# Patient Record
Sex: FEMALE | Race: BLACK OR AFRICAN AMERICAN
[De-identification: names, ages, dates, MRNs, and addresses within clinical notes are randomized per-mention and may not be internally consistent; named-entity substitution may affect disease eponyms.]

---

## 2021-02-01 ENCOUNTER — HOSPITAL ENCOUNTER (OUTPATIENT)
Dept: HOSPITAL 92 - BICCT | Age: 51
Discharge: HOME | End: 2021-02-01
Attending: INTERNAL MEDICINE
Payer: COMMERCIAL

## 2021-02-01 DIAGNOSIS — R19.7: ICD-10-CM

## 2021-02-01 DIAGNOSIS — R10.13: Primary | ICD-10-CM

## 2021-02-01 DIAGNOSIS — R63.4: ICD-10-CM

## 2021-02-01 PROCEDURE — 74177 CT ABD & PELVIS W/CONTRAST: CPT

## 2021-02-01 NOTE — CT
CT of theabdomen and pelvis:

2/1/2021



COMPARISON:None available



HISTORY:Epigastric pain, right-sided abdominal pain for 5 months



Change in stool



TECHNIQUE: Serial axial CT imaging at5 mm intervals from thelung bases through the pubic symphysis wi
th IV and oral contrast.  Coronal and sagittal reformatted imaging obtained. 



Findings:The imaged lung bases are unremarkable. No free intraperitoneal air or fluid.



The gallbladder is contracted and not well evaluated on this study. No focal liver lesion. The spleen
, pancreas, adrenal glands, and kidneys demonstrate no acute findings.



The uterus appears surgically absent.



The rectum is expanded and filled with stool. There is a broad based umbilical hernia containing a lo
op of nonobstructed bowel, likely small bowel.



Colon is decompressed/collapse and multiple regions, limiting assessment. No evidence for bowel obstr
uction, or focal bowel inflammatory change, or appendicitis.



The vascular structures appear patent. No abdominal or pelvic lymphadenopathy. No acute osseous abnor
mality.



IMPRESSION: No acute findings within the abdomen/pelvis.



Reported By: Javon Schofield 

Electronically Signed:  2/1/2021 10:38 AM

## 2021-03-11 ENCOUNTER — HOSPITAL ENCOUNTER (OUTPATIENT)
Dept: HOSPITAL 92 - LABBT | Age: 51
Discharge: HOME | End: 2021-03-11
Attending: SURGERY
Payer: COMMERCIAL

## 2021-03-11 DIAGNOSIS — K81.9: ICD-10-CM

## 2021-03-11 DIAGNOSIS — Z01.812: Primary | ICD-10-CM

## 2021-03-11 DIAGNOSIS — Z20.822: ICD-10-CM

## 2021-03-11 LAB
ALBUMIN SERPL BCG-MCNC: 4.3 G/DL (ref 3.5–5)
ALP SERPL-CCNC: 67 U/L (ref 40–110)
ALT SERPL W P-5'-P-CCNC: 11 U/L (ref 8–55)
ANION GAP SERPL CALC-SCNC: 13 MMOL/L (ref 10–20)
AST SERPL-CCNC: 14 U/L (ref 5–34)
BASOPHILS # BLD AUTO: 0 10X3/UL (ref 0–0.2)
BASOPHILS NFR BLD AUTO: 0.6 % (ref 0–2)
BILIRUB DIRECT SERPL-MCNC: 0.2 MG/DL (ref 0.1–0.3)
BILIRUB SERPL-MCNC: 0.4 MG/DL (ref 0.2–1.2)
BUN SERPL-MCNC: 14 MG/DL (ref 7–18.7)
CALCIUM SERPL-MCNC: 9.4 MG/DL (ref 7.8–10.44)
CHLORIDE SERPL-SCNC: 102 MMOL/L (ref 98–107)
CO2 SERPL-SCNC: 29 MMOL/L (ref 22–29)
CREAT CL PREDICTED SERPL C-G-VRATE: 0 ML/MIN (ref 70–130)
EOSINOPHIL # BLD AUTO: 0.1 10X3/UL (ref 0–0.5)
EOSINOPHIL NFR BLD AUTO: 1.8 % (ref 0–6)
GLUCOSE SERPL-MCNC: 76 MG/DL (ref 70–105)
HGB BLD-MCNC: 12.6 G/DL (ref 12–15.5)
LYMPHOCYTES NFR BLD AUTO: 33.3 % (ref 18–47)
MCH RBC QN AUTO: 27.8 PG (ref 27–33)
MCV RBC AUTO: 87.4 FL (ref 81.6–98.3)
MONOCYTES # BLD AUTO: 0.4 10X3/UL (ref 0–1.1)
MONOCYTES NFR BLD AUTO: 7.4 % (ref 0–10)
NEUTROPHILS # BLD AUTO: 2.8 10X3/UL (ref 1.5–8.4)
NEUTROPHILS NFR BLD AUTO: 56.5 % (ref 40–75)
PLATELET # BLD AUTO: 242 10X3/UL (ref 150–450)
POTASSIUM SERPL-SCNC: 4.5 MMOL/L (ref 3.5–5.1)
RBC # BLD AUTO: 4.54 10X6/UL (ref 3.9–5.03)
SODIUM SERPL-SCNC: 139 MMOL/L (ref 136–145)
WBC # BLD AUTO: 5 10X3/UL (ref 3.5–10.5)

## 2021-03-11 PROCEDURE — U0005 INFEC AGEN DETEC AMPLI PROBE: HCPCS

## 2021-03-11 PROCEDURE — U0003 INFECTIOUS AGENT DETECTION BY NUCLEIC ACID (DNA OR RNA); SEVERE ACUTE RESPIRATORY SYNDROME CORONAVIRUS 2 (SARS-COV-2) (CORONAVIRUS DISEASE [COVID-19]), AMPLIFIED PROBE TECHNIQUE, MAKING USE OF HIGH THROUGHPUT TECHNOLOGIES AS DESCRIBED BY CMS-2020-01-R: HCPCS

## 2021-03-11 PROCEDURE — 87635 SARS-COV-2 COVID-19 AMP PRB: CPT

## 2021-03-11 PROCEDURE — 80048 BASIC METABOLIC PNL TOTAL CA: CPT

## 2021-03-11 PROCEDURE — 80076 HEPATIC FUNCTION PANEL: CPT

## 2021-03-11 PROCEDURE — 85025 COMPLETE CBC W/AUTO DIFF WBC: CPT

## 2021-03-16 ENCOUNTER — HOSPITAL ENCOUNTER (OUTPATIENT)
Dept: HOSPITAL 92 - SDC | Age: 51
Discharge: HOME | End: 2021-03-16
Attending: SURGERY
Payer: COMMERCIAL

## 2021-03-16 VITALS — BODY MASS INDEX: 34.7 KG/M2

## 2021-03-16 DIAGNOSIS — E66.9: ICD-10-CM

## 2021-03-16 DIAGNOSIS — Z88.6: ICD-10-CM

## 2021-03-16 DIAGNOSIS — J30.2: ICD-10-CM

## 2021-03-16 DIAGNOSIS — Z79.899: ICD-10-CM

## 2021-03-16 DIAGNOSIS — I10: ICD-10-CM

## 2021-03-16 DIAGNOSIS — K80.10: Primary | ICD-10-CM

## 2021-03-16 PROCEDURE — 88304 TISSUE EXAM BY PATHOLOGIST: CPT

## 2021-03-16 PROCEDURE — 0FT44ZZ RESECTION OF GALLBLADDER, PERCUTANEOUS ENDOSCOPIC APPROACH: ICD-10-PCS | Performed by: SURGERY

## 2021-03-16 PROCEDURE — S0020 INJECTION, BUPIVICAINE HYDRO: HCPCS

## 2022-06-30 ENCOUNTER — HOSPITAL ENCOUNTER (OUTPATIENT)
Dept: HOSPITAL 92 - BICMRI | Age: 52
Discharge: HOME | End: 2022-06-30
Attending: ORTHOPAEDIC SURGERY
Payer: COMMERCIAL

## 2022-06-30 DIAGNOSIS — M67.432: Primary | ICD-10-CM

## 2022-07-29 ENCOUNTER — HOSPITAL ENCOUNTER (OUTPATIENT)
Dept: HOSPITAL 92 - LABBT | Age: 52
Discharge: HOME | End: 2022-07-29
Attending: ORTHOPAEDIC SURGERY
Payer: COMMERCIAL

## 2022-07-29 DIAGNOSIS — Z20.822: ICD-10-CM

## 2022-07-29 DIAGNOSIS — M67.432: ICD-10-CM

## 2022-07-29 DIAGNOSIS — Z01.812: Primary | ICD-10-CM

## 2022-07-29 LAB
ANION GAP SERPL CALC-SCNC: 13 MMOL/L (ref 10–20)
BASOPHILS # BLD AUTO: 0 10X3/UL (ref 0–0.2)
BASOPHILS NFR BLD AUTO: 0.8 % (ref 0–2)
BUN SERPL-MCNC: 14 MG/DL (ref 9.8–20.1)
CALCIUM SERPL-MCNC: 9.7 MG/DL (ref 7.8–10.44)
CHLORIDE SERPL-SCNC: 104 MMOL/L (ref 98–107)
CO2 SERPL-SCNC: 26 MMOL/L (ref 22–29)
CREAT CL PREDICTED SERPL C-G-VRATE: 0 ML/MIN (ref 70–130)
EOSINOPHIL # BLD AUTO: 0.1 10X3/UL (ref 0–0.5)
EOSINOPHIL NFR BLD AUTO: 2.5 % (ref 0–6)
GLUCOSE SERPL-MCNC: 98 MG/DL (ref 70–105)
HGB BLD-MCNC: 13.4 G/DL (ref 12–15.5)
LYMPHOCYTES NFR BLD AUTO: 35.7 % (ref 18–47)
MCH RBC QN AUTO: 28.4 PG (ref 27–33)
MCV RBC AUTO: 86.4 FL (ref 81.6–98.3)
MONOCYTES # BLD AUTO: 0.3 10X3/UL (ref 0–1.1)
MONOCYTES NFR BLD AUTO: 6.8 % (ref 0–10)
NEUTROPHILS # BLD AUTO: 2.6 10X3/UL (ref 1.5–8.4)
NEUTROPHILS NFR BLD AUTO: 53.8 % (ref 40–75)
PLATELET # BLD AUTO: 269 10X3/UL (ref 150–450)
POTASSIUM SERPL-SCNC: 4 MMOL/L (ref 3.5–5.1)
RBC # BLD AUTO: 4.72 10X6/UL (ref 3.9–5.03)
SODIUM SERPL-SCNC: 139 MMOL/L (ref 136–145)
WBC # BLD AUTO: 4.8 10X3/UL (ref 3.5–10.5)

## 2022-07-29 PROCEDURE — 80048 BASIC METABOLIC PNL TOTAL CA: CPT

## 2022-07-29 PROCEDURE — 85025 COMPLETE CBC W/AUTO DIFF WBC: CPT

## 2022-07-29 PROCEDURE — 87811 SARS-COV-2 COVID19 W/OPTIC: CPT

## 2022-08-03 ENCOUNTER — HOSPITAL ENCOUNTER (OUTPATIENT)
Dept: HOSPITAL 92 - SDC | Age: 52
Discharge: HOME | End: 2022-08-03
Attending: ORTHOPAEDIC SURGERY
Payer: COMMERCIAL

## 2022-08-03 VITALS — BODY MASS INDEX: 34.9 KG/M2

## 2022-08-03 DIAGNOSIS — M67.432: Primary | ICD-10-CM

## 2022-08-03 DIAGNOSIS — Z88.8: ICD-10-CM

## 2022-08-03 DIAGNOSIS — E66.9: ICD-10-CM

## 2022-08-03 DIAGNOSIS — Z79.899: ICD-10-CM

## 2022-08-03 DIAGNOSIS — I10: ICD-10-CM

## 2022-08-03 PROCEDURE — 0LB60ZZ EXCISION OF LEFT LOWER ARM AND WRIST TENDON, OPEN APPROACH: ICD-10-PCS | Performed by: ORTHOPAEDIC SURGERY
